# Patient Record
Sex: MALE | Race: WHITE | Employment: FULL TIME | URBAN - NONMETROPOLITAN AREA
[De-identification: names, ages, dates, MRNs, and addresses within clinical notes are randomized per-mention and may not be internally consistent; named-entity substitution may affect disease eponyms.]

---
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
## 2019-07-17 NOTE — PROGRESS NOTES
Subjective     Bakari Osborne is a 18 year old male who presents to clinic today for the following health issues:    HPI     Medication Followup of Celexa    Taking Medication as prescribed: yes    Side Effects:  None    Medication Helping Symptoms:  A little bit     Depression Followup    How are you doing with your depression since your last visit? Between stable and worsened    Are you having other symptoms that might be associated with depression? No    Have you had a significant life event?  No     Are you feeling anxious or having panic attacks?   No    Do you have any concerns with your use of alcohol or other drugs? No    Social History     Tobacco Use     Smoking status: Current Some Day Smoker     Smokeless tobacco: Never Used     Tobacco comment: vaporizor   Substance Use Topics     Alcohol use: No     Drug use: No   In the past two weeks have you had thoughts of suicide or self-harm?  Yes  In the past two weeks have you thought of a plan or intent to harm yourself? No.  Do you have concerns about your personal safety or the safety of others?   No    Suicide Assessment Five-step Evaluation and Treatment (SAFE-T)    Amount of exercise or physical activity: 6-7 days/week for an average of 30-45 minutes    Problems taking medications regularly: No    Medication side effects: none    Diet: regular (no restrictions)    Feels as though the Celexa has helped overall, but mood remains very labile. Ongoing SI that is fleeting, but still occurs. He has not been without this since his attempt last year. Did recently get a new job, happy about this. Relationship with SO is stable, she is with patient today.     Reviewed and updated as needed this visit by Provider  Tobacco  Allergies  Meds  Problems  Med Hx  Surg Hx  Fam Hx         Review of Systems   ROS COMP: Constitutional, HEENT, cardiovascular, pulmonary, gi and gu systems are negative, except as otherwise noted.      Objective    /64    "Pulse 64   Temp 97.2  F (36.2  C) (Tympanic)   Resp 16   Ht 1.803 m (5' 11\")   Wt 59.4 kg (131 lb)   SpO2 99%   BMI 18.27 kg/m    Body mass index is 18.27 kg/m .  Physical Exam   GENERAL: healthy, alert and no distress  RESP: lungs clear to auscultation - no rales, rhonchi or wheezes  CV: regular rate and rhythm, normal S1 S2, no S3 or S4, no murmur, click or rub, no peripheral edema and peripheral pulses strong  NEURO: Normal strength and tone, mentation intact and speech normal  PSYCH: mentation appears normal, affect normal/bright    Diagnostic Test Results:  Labs reviewed in Epic  Results for orders placed or performed in visit on 11/06/18   HIV Antigen Antibody Combo   Result Value Ref Range    HIV Antigen Antibody Combo Nonreactive NR^Nonreactive       Hepatitis C antibody   Result Value Ref Range    Hepatitis C Antibody Nonreactive NR^Nonreactive   Treponema Abs w Reflex to RPR and Titer   Result Value Ref Range    Treponema Antibodies Nonreactive NR^Nonreactive   GC/Chlamydia by PCR - [facility]   Result Value Ref Range    Specimen Source Urine     Neisseria gonorrhoreae PCR Not Detected NDET^Not Detected    Chlamydia Trachomatis PCR Not Detected NDET^Not Detected           Assessment & Plan     Recurrent major depressive disorder, in partial remission (H)  Discussed augmentation vs transition to alternative. He is missing doses of celexa with symptomatic withdrawal, would like to try prozac. Start 20mg daily. No taper/titration needed. Follow up 1 month. Pt aware of support system and numbers to call if SI worsens.   - FLUoxetine (PROZAC) 20 MG capsule; Take 1 capsule (20 mg) by mouth daily  Dispense: 30 capsule; Refill: 1     No follow-ups on file.    Megha Jeffery MD  [facility] [place]      "

## 2019-07-24 ENCOUNTER — OFFICE VISIT (OUTPATIENT)
Dept: FAMILY MEDICINE | Facility: OTHER | Age: 19
End: 2019-07-24
Attending: FAMILY MEDICINE

## 2019-07-24 VITALS
RESPIRATION RATE: 16 BRPM | TEMPERATURE: 97.2 F | WEIGHT: 131 LBS | SYSTOLIC BLOOD PRESSURE: 102 MMHG | HEART RATE: 64 BPM | OXYGEN SATURATION: 99 % | DIASTOLIC BLOOD PRESSURE: 64 MMHG | BODY MASS INDEX: 18.34 KG/M2 | HEIGHT: 71 IN

## 2019-07-24 DIAGNOSIS — F33.41 RECURRENT MAJOR DEPRESSIVE DISORDER, IN PARTIAL REMISSION (H): Primary | ICD-10-CM

## 2019-07-24 PROCEDURE — 99213 OFFICE O/P EST LOW 20 MIN: CPT | Performed by: FAMILY MEDICINE

## 2019-07-24 ASSESSMENT — ANXIETY QUESTIONNAIRES
4. TROUBLE RELAXING: SEVERAL DAYS
2. NOT BEING ABLE TO STOP OR CONTROL WORRYING: MORE THAN HALF THE DAYS
7. FEELING AFRAID AS IF SOMETHING AWFUL MIGHT HAPPEN: SEVERAL DAYS
1. FEELING NERVOUS, ANXIOUS, OR ON EDGE: SEVERAL DAYS
6. BECOMING EASILY ANNOYED OR IRRITABLE: MORE THAN HALF THE DAYS
5. BEING SO RESTLESS THAT IT IS HARD TO SIT STILL: NOT AT ALL
3. WORRYING TOO MUCH ABOUT DIFFERENT THINGS: MORE THAN HALF THE DAYS
GAD7 TOTAL SCORE: 9
IF YOU CHECKED OFF ANY PROBLEMS ON THIS QUESTIONNAIRE, HOW DIFFICULT HAVE THESE PROBLEMS MADE IT FOR YOU TO DO YOUR WORK, TAKE CARE OF THINGS AT HOME, OR GET ALONG WITH OTHER PEOPLE: SOMEWHAT DIFFICULT

## 2019-07-24 ASSESSMENT — PAIN SCALES - GENERAL: PAINLEVEL: NO PAIN (0)

## 2019-07-24 ASSESSMENT — PATIENT HEALTH QUESTIONNAIRE - PHQ9: SUM OF ALL RESPONSES TO PHQ QUESTIONS 1-9: 12

## 2019-07-24 ASSESSMENT — MIFFLIN-ST. JEOR: SCORE: 1631.34

## 2019-07-24 NOTE — NURSING NOTE
"Chief Complaint   Patient presents with     Depression     Recheck Medication       Initial /64   Pulse 64   Temp 97.2  F (36.2  C) (Tympanic)   Resp 16   Ht 1.803 m (5' 11\")   Wt 59.4 kg (131 lb)   SpO2 99%   BMI 18.27 kg/m   Estimated body mass index is 18.27 kg/m  as calculated from the following:    Height as of this encounter: 1.803 m (5' 11\").    Weight as of this encounter: 59.4 kg (131 lb).  Medication Reconciliation: complete  "

## 2019-07-25 ASSESSMENT — ANXIETY QUESTIONNAIRES: GAD7 TOTAL SCORE: 9

## 2019-08-16 NOTE — PROGRESS NOTES
Subjective     Bakari Osborne is a 19 year old male who presents to clinic today for the following health issues:    HPI     Depression Followup    How are you doing with your depression since your last visit? Up an down    Are you having other symptoms that might be associated with depression? No    Have you had a significant life event?  No     Are you feeling anxious or having panic attacks?   No    Do you have any concerns with your use of alcohol or other drugs? No    Social History     Tobacco Use     Smoking status: Current Some Day Smoker     Smokeless tobacco: Never Used     Tobacco comment: vaporizor   Substance Use Topics     Alcohol use: No     Drug use: No     PHQ 11/6/2018 7/24/2019 8/22/2019   PHQ-9 Total Score 3 12 5   Q9: Thoughts of better off dead/self-harm past 2 weeks Not at all More than half the days Several days   F/U: Thoughts of suicide or self-harm - Yes No   F/U: Self harm-plan - No No   F/U: Self-harm action - No No   F/U: Safety concerns - No No     JIGAR-7 SCORE 9/4/2018 11/6/2018 7/24/2019   Total Score 1 2 9       How many servings of fruits and vegetables do you eat daily?  2-3    On average, how many sweetened beverages do you drink each day (soda, juice, sweet tea, etc)?   2    How many days per week do you miss taking your medication? 0    Medication Followup of  Prozac    Taking Medication as prescribed: yes    Side Effects:  None    Medication Helping Symptoms:  Yes - feels like it is helping     Patient reports doing well on the prozac. Has no side effects. Feels it is helping somewhat. No SI currently. Had no increase in this when starting either. Would like to try increasing dose.     Reviewed and updated as needed this visit by Provider  Tobacco  Allergies  Meds  Problems  Med Hx  Surg Hx  Fam Hx         Review of Systems   ROS COMP: Constitutional, HEENT, cardiovascular, pulmonary, gi and gu systems are negative, except as otherwise noted.      Objective    BP  "102/64   Pulse 59   Temp 97  F (36.1  C) (Tympanic)   Resp 16   Ht 1.803 m (5' 11\")   Wt 59.9 kg (132 lb)   SpO2 99%   BMI 18.41 kg/m    Body mass index is 18.41 kg/m .     Physical Exam   GENERAL: healthy, alert and no distress  MS: no gross musculoskeletal defects noted, no edema  NEURO: Normal strength and tone, mentation intact and speech normal  PSYCH: mentation appears normal, affect normal/bright    Diagnostic Test Results:  Labs reviewed in Epic  Results for orders placed or performed in visit on 11/06/18   HIV Antigen Antibody Combo   Result Value Ref Range    HIV Antigen Antibody Combo Nonreactive NR^Nonreactive       Hepatitis C antibody   Result Value Ref Range    Hepatitis C Antibody Nonreactive NR^Nonreactive   Treponema Abs w Reflex to RPR and Titer   Result Value Ref Range    Treponema Antibodies Nonreactive NR^Nonreactive   GC/Chlamydia by PCR - [facility]   Result Value Ref Range    Specimen Source Urine     Neisseria gonorrhoreae PCR Not Detected NDET^Not Detected    Chlamydia Trachomatis PCR Not Detected NDET^Not Detected         Assessment & Plan     Severe episode of recurrent major depressive disorder, without psychotic features (H)  Doing well on prozac, increase to 40mg per request. Follow up 2 months.   - FLUoxetine (PROZAC) 40 MG capsule; Take 1 capsule (40 mg) by mouth daily  Dispense: 90 capsule; Refill: 3     Vaccination due  Patient due to HPV. Discussed today, he would like to research further and consider at follow up.     Return in about 2 months (around 10/22/2019) for depression, hpv vaccin.    Megha Jeffery MD  [facility] [place]      "

## 2019-08-22 ENCOUNTER — OFFICE VISIT (OUTPATIENT)
Dept: FAMILY MEDICINE | Facility: OTHER | Age: 19
End: 2019-08-22
Attending: FAMILY MEDICINE

## 2019-08-22 VITALS
DIASTOLIC BLOOD PRESSURE: 64 MMHG | SYSTOLIC BLOOD PRESSURE: 102 MMHG | TEMPERATURE: 97 F | OXYGEN SATURATION: 99 % | HEIGHT: 71 IN | RESPIRATION RATE: 16 BRPM | HEART RATE: 59 BPM | WEIGHT: 132 LBS | BODY MASS INDEX: 18.48 KG/M2

## 2019-08-22 DIAGNOSIS — F33.2 SEVERE EPISODE OF RECURRENT MAJOR DEPRESSIVE DISORDER, WITHOUT PSYCHOTIC FEATURES (H): Primary | ICD-10-CM

## 2019-08-22 PROCEDURE — 99213 OFFICE O/P EST LOW 20 MIN: CPT | Performed by: FAMILY MEDICINE

## 2019-08-22 RX ORDER — FLUOXETINE 40 MG/1
40 CAPSULE ORAL DAILY
Qty: 90 CAPSULE | Refills: 3 | Status: SHIPPED | OUTPATIENT
Start: 2019-08-22

## 2019-08-22 ASSESSMENT — MIFFLIN-ST. JEOR: SCORE: 1630.88

## 2019-08-22 ASSESSMENT — PATIENT HEALTH QUESTIONNAIRE - PHQ9: SUM OF ALL RESPONSES TO PHQ QUESTIONS 1-9: 5

## 2019-08-22 ASSESSMENT — PAIN SCALES - GENERAL: PAINLEVEL: NO PAIN (0)

## 2019-08-22 NOTE — NURSING NOTE
"Chief Complaint   Patient presents with     Depression       Initial /64   Pulse 59   Temp 97  F (36.1  C) (Tympanic)   Resp 16   Ht 1.803 m (5' 11\")   Wt 59.9 kg (132 lb)   SpO2 99%   BMI 18.41 kg/m   Estimated body mass index is 18.41 kg/m  as calculated from the following:    Height as of this encounter: 1.803 m (5' 11\").    Weight as of this encounter: 59.9 kg (132 lb).  Medication Reconciliation: complete  "

## 2019-10-24 ENCOUNTER — TELEPHONE (OUTPATIENT)
Dept: FAMILY MEDICINE | Facility: OTHER | Age: 19
End: 2019-10-24

## 2019-10-24 NOTE — TELEPHONE ENCOUNTER
8:46 AM    Reason for Call: OVERBOOK    Patient is having the following symptoms: A [employer] letter to be signed & med check/mychart request    The patient is requesting an appointment for 10/24-10/31 with Dr Jeffery.    Was an appointment offered for this call? No  If yes : Appointment type              Date    Preferred method for responding to this message: Telephone Call  What is your phone number ?[phone]    If we cannot reach you directly, may we leave a detailed response at the number you provided? Yes    Can this message wait until your PCP/provider returns, if unavailable today? Not applicable,     Netta Majano

## 2019-12-13 ENCOUNTER — TELEPHONE (OUTPATIENT)
Dept: FAMILY MEDICINE | Facility: OTHER | Age: 19
End: 2019-12-13

## 2019-12-13 NOTE — TELEPHONE ENCOUNTER
8:56 AM    Reason for Call: OVERBOOK    Patient is having the following symptoms: Discuss depression medication/ refill/mychart request    The patient is requesting an appointment for 12/16/-12/20 with Dr Jeffery.    Was an appointment offered for this call? No  If yes : Appointment type              Date    Preferred method for responding to this message: Telephone Call  What is your phone number ?[phone]    If we cannot reach you directly, may we leave a detailed response at the number you provided? Yes    Can this message wait until your PCP/provider returns, if unavailable today? Not applicable,     Netta Majano

## 2019-12-16 NOTE — TELEPHONE ENCOUNTER
LM notifying patient there is no availability with Dr. Jeffery for the time frame requested. Advised patient to return call to clinic to reschedule to next available.

## 2020-03-10 ENCOUNTER — HEALTH MAINTENANCE LETTER (OUTPATIENT)
Age: 20
End: 2020-03-10

## 2020-12-27 ENCOUNTER — HEALTH MAINTENANCE LETTER (OUTPATIENT)
Age: 20
End: 2020-12-27

## 2021-04-24 ENCOUNTER — HEALTH MAINTENANCE LETTER (OUTPATIENT)
Age: 21
End: 2021-04-24

## 2021-10-04 ENCOUNTER — HEALTH MAINTENANCE LETTER (OUTPATIENT)
Age: 21
End: 2021-10-04

## 2022-05-15 ENCOUNTER — HEALTH MAINTENANCE LETTER (OUTPATIENT)
Age: 22
End: 2022-05-15

## 2022-06-05 ENCOUNTER — MYC MEDICAL ADVICE (OUTPATIENT)
Dept: FAMILY MEDICINE | Facility: OTHER | Age: 22
End: 2022-06-05

## 2022-09-11 ENCOUNTER — HEALTH MAINTENANCE LETTER (OUTPATIENT)
Age: 22
End: 2022-09-11

## 2022-11-29 NOTE — PROGRESS NOTES
Patient asleep and unable to awaken with words to do interview. He did tell staff he would consider medications today but has court at 15:30.  Would approach after court with medication information as has not wanted previously.  Request may be prompted to appear compliant for today's court appearance rather that actual consent to comply with medication. ENDER Sawyer CNP   Patient returned nurse phone call from last message. Please call her back.  .

## 2023-03-15 NOTE — PLAN OF CARE
Face to face end of shift report received from CANDIS Chambers. Rounding completed. Patient observed. Lying in supine position - eyes closed - non-labored breathing noted - some restlessness noted in legs.    Marley Rubin  6/18/2018  12:00 AM           Initial Size Of Lesion: 3

## 2023-06-03 ENCOUNTER — HEALTH MAINTENANCE LETTER (OUTPATIENT)
Age: 23
End: 2023-06-03

## 2024-08-22 ENCOUNTER — TELEPHONE (OUTPATIENT)
Dept: FAMILY MEDICINE | Facility: OTHER | Age: 24
End: 2024-08-22